# Patient Record
Sex: FEMALE | Race: BLACK OR AFRICAN AMERICAN | NOT HISPANIC OR LATINO | URBAN - METROPOLITAN AREA
[De-identification: names, ages, dates, MRNs, and addresses within clinical notes are randomized per-mention and may not be internally consistent; named-entity substitution may affect disease eponyms.]

---

## 2020-09-28 ENCOUNTER — HOSPITAL ENCOUNTER (EMERGENCY)
Facility: HOSPITAL | Age: 80
Discharge: HOME | End: 2020-09-28
Attending: EMERGENCY MEDICINE
Payer: MEDICARE

## 2020-09-28 VITALS
HEART RATE: 98 BPM | SYSTOLIC BLOOD PRESSURE: 164 MMHG | BODY MASS INDEX: 28 KG/M2 | WEIGHT: 158 LBS | TEMPERATURE: 99 F | DIASTOLIC BLOOD PRESSURE: 84 MMHG | OXYGEN SATURATION: 96 % | RESPIRATION RATE: 16 BRPM | HEIGHT: 63 IN

## 2020-09-28 DIAGNOSIS — I10 HYPERTENSION, UNSPECIFIED TYPE: Primary | ICD-10-CM

## 2020-09-28 DIAGNOSIS — I10 HYPERTENSION: ICD-10-CM

## 2020-09-28 LAB
BUN SERPL-MCNC: 14 MG/DL (ref 6–30)
BUN SERPL-MCNC: 14 MG/DL (ref 6–30)
CHLORIDE SERPL-SCNC: 104 MMOL/L (ref 95–110)
CHLORIDE SERPL-SCNC: 104 MMOL/L (ref 95–110)
CREAT SERPL-MCNC: 0.6 MG/DL (ref 0.5–1.4)
CREAT SERPL-MCNC: 0.6 MG/DL (ref 0.5–1.4)
GLUCOSE SERPL-MCNC: 128 MG/DL (ref 70–110)
GLUCOSE SERPL-MCNC: 129 MG/DL (ref 70–110)
HCT VFR BLD CALC: 38 %PCV (ref 36–54)
HCT VFR BLD CALC: 38 %PCV (ref 36–54)
POC IONIZED CALCIUM: 1.26 MMOL/L (ref 1.06–1.42)
POC IONIZED CALCIUM: 1.27 MMOL/L (ref 1.06–1.42)
POC TCO2 (MEASURED): 26 MMOL/L (ref 23–29)
POC TCO2 (MEASURED): 27 MMOL/L (ref 23–29)
POTASSIUM BLD-SCNC: 3.2 MMOL/L (ref 3.5–5.1)
POTASSIUM BLD-SCNC: 3.2 MMOL/L (ref 3.5–5.1)
SAMPLE: ABNORMAL
SAMPLE: ABNORMAL
SODIUM BLD-SCNC: 144 MMOL/L (ref 136–145)
SODIUM BLD-SCNC: 144 MMOL/L (ref 136–145)
TROPONIN I SERPL DL<=0.01 NG/ML-MCNC: <0.006 NG/ML (ref 0–0.03)

## 2020-09-28 PROCEDURE — 99284 EMERGENCY DEPT VISIT MOD MDM: CPT | Mod: 25

## 2020-09-28 PROCEDURE — 80047 BASIC METABLC PNL IONIZED CA: CPT

## 2020-09-28 PROCEDURE — 84484 ASSAY OF TROPONIN QUANT: CPT

## 2020-09-28 PROCEDURE — 99284 EMERGENCY DEPT VISIT MOD MDM: CPT | Mod: ,,, | Performed by: EMERGENCY MEDICINE

## 2020-09-28 PROCEDURE — 93005 ELECTROCARDIOGRAM TRACING: CPT

## 2020-09-28 PROCEDURE — 93010 ELECTROCARDIOGRAM REPORT: CPT | Mod: ,,, | Performed by: INTERNAL MEDICINE

## 2020-09-28 PROCEDURE — 99284 PR EMERGENCY DEPT VISIT,LEVEL IV: ICD-10-PCS | Mod: ,,, | Performed by: EMERGENCY MEDICINE

## 2020-09-28 PROCEDURE — 93010 EKG 12-LEAD: ICD-10-PCS | Mod: ,,, | Performed by: INTERNAL MEDICINE

## 2020-09-28 RX ORDER — METFORMIN HYDROCHLORIDE 500 MG/1
500 TABLET ORAL 2 TIMES DAILY WITH MEALS
COMMUNITY

## 2020-09-28 RX ORDER — HYDROCHLOROTHIAZIDE 12.5 MG/1
12.5 TABLET ORAL DAILY
COMMUNITY

## 2020-09-28 RX ORDER — ATORVASTATIN CALCIUM 20 MG/1
20 TABLET, FILM COATED ORAL DAILY
COMMUNITY

## 2020-09-28 RX ORDER — LISINOPRIL 40 MG/1
40 TABLET ORAL DAILY
COMMUNITY

## 2020-09-28 RX ORDER — AMLODIPINE BESYLATE 10 MG/1
10 TABLET ORAL DAILY
COMMUNITY

## 2020-09-28 RX ORDER — ASPIRIN 81 MG/1
81 TABLET ORAL DAILY
COMMUNITY

## 2020-09-28 NOTE — ED PROVIDER NOTES
Encounter Date: 9/28/2020    SCRIBE #1 NOTE: I, Kami Granadonga, am scribing for, and in the presence of,  Morro Flores MD. I have scribed the entire note.       History     Chief Complaint   Patient presents with    Hypertension     home health took bp and was 166/117, i don't feel bad     Time patient was seen by the provider: 2:25 PM      The patient is a 80 y.o. female with past medical history of HTN, who presents to the ED with a complaint of high blood pressure. The patient reports of headache this morning which has now resolved on its own. Home health came over today and measured her blood pressure was measured at 166/117. Denies chest pain, shortness of breath.     The history is provided by the patient, medical records and a relative. No  was used.     Review of patient's allergies indicates:  No Known Allergies  Past Medical History:   Diagnosis Date    Diabetes mellitus     High cholesterol     Hypertension      History reviewed. No pertinent surgical history.  No family history on file.  Social History     Tobacco Use    Smoking status: Never Smoker    Smokeless tobacco: Never Used   Substance Use Topics    Alcohol use: Never     Frequency: Never    Drug use: Never     Review of Systems   Constitutional: Negative for fever.   HENT: Negative for sore throat.    Respiratory: Negative for shortness of breath.    Cardiovascular: Negative for chest pain.   Gastrointestinal: Negative for nausea.   Genitourinary: Negative for dysuria.   Musculoskeletal: Negative for back pain.   Skin: Negative for rash.   Neurological: Positive for headaches. Negative for weakness.   Hematological: Does not bruise/bleed easily.       Physical Exam     Initial Vitals [09/28/20 1415]   BP Pulse Resp Temp SpO2   (!) 164/84 98 16 99 °F (37.2 °C) 96 %      MAP       --         Physical Exam    Nursing note and vitals reviewed.  Constitutional: She appears well-developed and well-nourished. She is  not diaphoretic. No distress.   HENT:   Head: Normocephalic and atraumatic.   Mouth/Throat: Oropharynx is clear and moist.   Eyes: Conjunctivae and EOM are normal. Pupils are equal, round, and reactive to light.   Neck: Normal range of motion. Neck supple. No JVD present.   Cardiovascular: Normal rate and regular rhythm.   Murmur heard.   Systolic murmur is present.  Pulmonary/Chest: Breath sounds normal. No respiratory distress. She has no wheezes. She has no rhonchi. She has no rales.   Abdominal: Soft. Bowel sounds are normal. She exhibits no distension and no mass. There is no abdominal tenderness. There is no rebound and no guarding.   Musculoskeletal: Normal range of motion. No tenderness or edema.   Neurological: She is alert and oriented to person, place, and time. She has normal strength. No cranial nerve deficit or sensory deficit.   Skin: Skin is warm and dry. No rash noted.   Psychiatric: She has a normal mood and affect. Thought content normal.         ED Course   Procedures  Labs Reviewed   ISTAT PROCEDURE - Abnormal; Notable for the following components:       Result Value    POC Glucose 128 (*)     POC Potassium 3.2 (*)     All other components within normal limits   ISTAT PROCEDURE - Abnormal; Notable for the following components:    POC Glucose 129 (*)     POC Potassium 3.2 (*)     All other components within normal limits   TROPONIN I     EKG Readings: (Independently Interpreted)   Initial Reading: No STEMI. Rhythm: Sinus Tachycardia. Heart Rate: 100.   ST changes in inferior leads.     ECG Results          EKG 12-lead (Final result)  Result time 09/29/20 14:47:46    Final result by Interface, Lab In [facility] (09/29/20 14:47:46)                 Narrative:    Test Reason : R00.0    Vent. Rate : 103 BPM     Atrial Rate : 103 BPM     P-R Int : 126 ms          QRS Dur : 084 ms      QT Int : 332 ms       P-R-T Axes : 053 -25 073 degrees     QTc Int : 434 ms    Sinus tachycardia  Possible Left atrial  enlargement  Possible Anterior infarct (cited on or before 28-SEP-2020)  ST and T wave abnormality, consider inferior ischemia  Abnormal ECG  When compared with ECG of 28-SEP-2020 14:51,  No significant change was found  Confirmed by Hailee Reynoso MD ([id number]) on 9/29/2020 2:47:32 PM    Referred By:             Confirmed By:Hailee Reynoso MD                             EKG 12-lead (Final result)  Result time 09/29/20 14:47:38    Final result by Interface, Lab In [facility] (09/29/20 14:47:38)                 Narrative:    Test Reason : I10,    Vent. Rate : 101 BPM     Atrial Rate : 101 BPM     P-R Int : 126 ms          QRS Dur : 082 ms      QT Int : 334 ms       P-R-T Axes : 054 -24 098 degrees     QTc Int : 433 ms    Sinus tachycardia  Possible Left atrial enlargement  Anterior infarct ,age undetermined  Abnormal ECG  No previous ECGs available  Confirmed by Hailee Reynoso MD ([id number]) on 9/29/2020 2:47:23 PM    Referred By:             Confirmed By:Hailee Reynoso MD                            Imaging Results    None          Medical Decision Making:   History:   Old Medical Records: I decided to obtain old medical records.  Initial Assessment:   80 y.o female patient presents with elevated blood pressure. Will see if family can obtain medication list. Will check chemistries. No symptoms to suggest stroke, intracranial hemorrhage, MI or hypertensive crisis.   Independently Interpreted Test(s):   I have ordered and independently interpreted EKG Reading(s) - see prior notes  Clinical Tests:   Lab Tests: Ordered and Reviewed  Medical Tests: Ordered and Reviewed  ED Management:  4:58 PM  Studies are negative. Blood pressure is mildly elevated. Recommend to follow up with her PCP.             Scribe Attestation:   Scribe #1: I performed the above scribed service and the documentation accurately describes the services I performed. I attest to the accuracy of the note.                      Clinical Impression:     ICD-10-CM  ICD-9-CM   1. Hypertension, unspecified type  I10 401.9   2. Hypertension  I10 401.9                      Disposition:   Disposition: Discharged  Condition: Stable     ED Disposition Condition    Discharge Stable        ED Prescriptions     None        Follow-up Information     Follow up With Specialties Details Why Contact Info Additional Information    [facility] Schedule an appointment as soon as possible for a visit in 2 days For Primary Care and Emergency Department Follow-up [address] [facility] [address] [phone] [facility] Please park in surface lot and check in at central registration desk    [facility] Emergency Medicine  [facility] symptoms worsen [address] [facility] [address] [phone]                                        Morro Flores MD  10/02/20 0852

## 2020-09-28 NOTE — ED NOTES
Patient offered a warm blanket. Patient declined and states that she's comfortable with the temperature of the room.  Updated patient on plan of care. No other needs at this time.  Will continue to monitor.

## 2020-09-28 NOTE — ED NOTES
I-STAT Chem-8+ Results:   Value Reference Range   Sodium 144 136-145 mmol/L   Potassium  3.2 3.5-5.1 mmol/L   Chloride 104  mmol/L   Ionized Calcium 1.26 1.06-1.42 mmol/L   CO2 (measured) 27 23-29 mmol/L   Glucose 129  mg/dL   BUN 14 6-30 mg/dL   Creatinine 0.6 0.5-1.4 mg/dL   Hematocrit 38 36-54%

## 2024-01-11 ENCOUNTER — HOSPITAL ENCOUNTER (EMERGENCY)
Facility: HOSPITAL | Age: 84
Discharge: HOME | End: 2024-01-11
Attending: EMERGENCY MEDICINE
Payer: MEDICARE

## 2024-01-11 VITALS
OXYGEN SATURATION: 98 % | BODY MASS INDEX: 25.34 KG/M2 | SYSTOLIC BLOOD PRESSURE: 139 MMHG | TEMPERATURE: 99 F | DIASTOLIC BLOOD PRESSURE: 91 MMHG | WEIGHT: 143 LBS | RESPIRATION RATE: 16 BRPM | HEART RATE: 113 BPM | HEIGHT: 63 IN

## 2024-01-11 DIAGNOSIS — R07.9 CHEST PAIN: ICD-10-CM

## 2024-01-11 DIAGNOSIS — R42 DIZZINESS: ICD-10-CM

## 2024-01-11 DIAGNOSIS — I10 ESSENTIAL HYPERTENSION: Primary | ICD-10-CM

## 2024-01-11 DIAGNOSIS — E11.9 TYPE 2 DIABETES MELLITUS WITHOUT COMPLICATION, WITHOUT LONG-TERM CURRENT USE OF INSULIN: ICD-10-CM

## 2024-01-11 PROBLEM — H25.13 AGE-RELATED NUCLEAR CATARACT OF BOTH EYES: Status: ACTIVE | Noted: 2023-01-11

## 2024-01-11 PROBLEM — H40.033 ANATOMICAL NARROW ANGLE OF BOTH EYES: Status: ACTIVE | Noted: 2023-01-11

## 2024-01-11 PROBLEM — R09.82 POST-NASAL DRIP: Status: ACTIVE | Noted: 2022-12-08

## 2024-01-11 PROBLEM — E78.00 PURE HYPERCHOLESTEROLEMIA: Status: ACTIVE | Noted: 2022-12-08

## 2024-01-11 PROBLEM — M19.90 OSTEOARTHROSIS: Status: ACTIVE | Noted: 2024-01-11

## 2024-01-11 PROBLEM — H40.9 GLAUCOMA: Status: ACTIVE | Noted: 2022-12-08

## 2024-01-11 PROBLEM — E66.3 OVERWEIGHT: Status: ACTIVE | Noted: 2024-01-11

## 2024-01-11 PROBLEM — Z00.00 ANNUAL PHYSICAL EXAM: Status: ACTIVE | Noted: 2022-12-08

## 2024-01-11 PROBLEM — F32.A DEPRESSION: Status: ACTIVE | Noted: 2022-12-08

## 2024-01-11 LAB
ALBUMIN SERPL BCP-MCNC: 3.9 G/DL (ref 3.5–5.2)
ALP SERPL-CCNC: 43 U/L (ref 55–135)
ALT SERPL W/O P-5'-P-CCNC: 14 U/L (ref 10–44)
ANION GAP SERPL CALC-SCNC: 9 MMOL/L (ref 8–16)
AST SERPL-CCNC: 30 U/L (ref 10–40)
BASOPHILS # BLD AUTO: 0.04 K/UL (ref 0–0.2)
BASOPHILS NFR BLD: 0.7 % (ref 0–1.9)
BILIRUB SERPL-MCNC: 0.4 MG/DL (ref 0.1–1)
BUN SERPL-MCNC: 24 MG/DL (ref 8–23)
CALCIUM SERPL-MCNC: 10.2 MG/DL (ref 8.7–10.5)
CHLORIDE SERPL-SCNC: 107 MMOL/L (ref 95–110)
CO2 SERPL-SCNC: 23 MMOL/L (ref 23–29)
CREAT SERPL-MCNC: 1 MG/DL (ref 0.5–1.4)
D DIMER PPP IA.FEU-MCNC: 1.36 MG/L FEU
DIFFERENTIAL METHOD BLD: ABNORMAL
EOSINOPHIL # BLD AUTO: 0.1 K/UL (ref 0–0.5)
EOSINOPHIL NFR BLD: 1.1 % (ref 0–8)
ERYTHROCYTE [DISTWIDTH] IN BLOOD BY AUTOMATED COUNT: 12.5 % (ref 11.5–14.5)
EST. GFR  (NO RACE VARIABLE): 55.9 ML/MIN/1.73 M^2
GLUCOSE SERPL-MCNC: 118 MG/DL (ref 70–110)
HCT VFR BLD AUTO: 37.1 % (ref 37–48.5)
HCV AB SERPL QL IA: NORMAL
HGB BLD-MCNC: 11.9 G/DL (ref 12–16)
HIV 1+2 AB+HIV1 P24 AG SERPL QL IA: NORMAL
IMM GRANULOCYTES # BLD AUTO: 0.01 K/UL (ref 0–0.04)
IMM GRANULOCYTES NFR BLD AUTO: 0.2 % (ref 0–0.5)
LYMPHOCYTES # BLD AUTO: 2.8 K/UL (ref 1–4.8)
LYMPHOCYTES NFR BLD: 45.1 % (ref 18–48)
MCH RBC QN AUTO: 29.6 PG (ref 27–31)
MCHC RBC AUTO-ENTMCNC: 32.1 G/DL (ref 32–36)
MCV RBC AUTO: 92 FL (ref 82–98)
MONOCYTES # BLD AUTO: 0.5 K/UL (ref 0.3–1)
MONOCYTES NFR BLD: 8 % (ref 4–15)
NEUTROPHILS # BLD AUTO: 2.7 K/UL (ref 1.8–7.7)
NEUTROPHILS NFR BLD: 44.9 % (ref 38–73)
NRBC BLD-RTO: 0 /100 WBC
PLATELET # BLD AUTO: 261 K/UL (ref 150–450)
PMV BLD AUTO: 11 FL (ref 9.2–12.9)
POC CARDIAC TROPONIN I: 0 NG/ML (ref 0–0.08)
POTASSIUM SERPL-SCNC: 4.5 MMOL/L (ref 3.5–5.1)
PROT SERPL-MCNC: 9 G/DL (ref 6–8.4)
RBC # BLD AUTO: 4.02 M/UL (ref 4–5.4)
SAMPLE: NORMAL
SODIUM SERPL-SCNC: 139 MMOL/L (ref 136–145)
TROPONIN I SERPL DL<=0.01 NG/ML-MCNC: <0.006 NG/ML (ref 0–0.03)
WBC # BLD AUTO: 6.1 K/UL (ref 3.9–12.7)

## 2024-01-11 PROCEDURE — 85379 FIBRIN DEGRADATION QUANT: CPT

## 2024-01-11 PROCEDURE — 99285 EMERGENCY DEPT VISIT HI MDM: CPT | Mod: 25

## 2024-01-11 PROCEDURE — 87389 HIV-1 AG W/HIV-1&-2 AB AG IA: CPT | Performed by: PHYSICIAN ASSISTANT

## 2024-01-11 PROCEDURE — 80053 COMPREHEN METABOLIC PANEL: CPT

## 2024-01-11 PROCEDURE — 84484 ASSAY OF TROPONIN QUANT: CPT

## 2024-01-11 PROCEDURE — 93010 ELECTROCARDIOGRAM REPORT: CPT | Mod: 76,,, | Performed by: INTERNAL MEDICINE

## 2024-01-11 PROCEDURE — 93005 ELECTROCARDIOGRAM TRACING: CPT

## 2024-01-11 PROCEDURE — 93010 ELECTROCARDIOGRAM REPORT: CPT | Mod: ,,, | Performed by: INTERNAL MEDICINE

## 2024-01-11 PROCEDURE — 86803 HEPATITIS C AB TEST: CPT | Performed by: PHYSICIAN ASSISTANT

## 2024-01-11 PROCEDURE — 85025 COMPLETE CBC W/AUTO DIFF WBC: CPT

## 2024-01-11 PROCEDURE — 25500020 PHARM REV CODE 255: Performed by: EMERGENCY MEDICINE

## 2024-01-11 RX ORDER — TRAZODONE HYDROCHLORIDE 50 MG/1
50 TABLET ORAL
COMMUNITY
Start: 2023-02-16 | End: 2024-02-16

## 2024-01-11 RX ORDER — PREDNISOLONE ACETATE 10 MG/ML
SUSPENSION/ DROPS OPHTHALMIC
COMMUNITY

## 2024-01-11 RX ORDER — LATANOPROST 50 UG/ML
1 SOLUTION/ DROPS OPHTHALMIC
COMMUNITY
Start: 2023-01-11 | End: 2024-01-11

## 2024-01-11 RX ORDER — MECLIZINE HYDROCHLORIDE 25 MG/1
25 TABLET ORAL 3 TIMES DAILY PRN
Qty: 20 TABLET | Refills: 0 | Status: SHIPPED | OUTPATIENT
Start: 2024-01-11

## 2024-01-11 RX ORDER — KETOROLAC TROMETHAMINE 5 MG/ML
SOLUTION OPHTHALMIC
COMMUNITY
Start: 2023-05-05

## 2024-01-11 RX ADMIN — IOHEXOL 75 ML: 350 INJECTION, SOLUTION INTRAVENOUS at 11:01

## 2024-01-11 NOTE — DISCHARGE INSTRUCTIONS
Diagnosis: Dizziness    Tests today showed:   Labs Reviewed   CBC W/ AUTO DIFFERENTIAL - Abnormal; Notable for the following components:       Result Value    Hemoglobin 11.9 (*)     All other components within normal limits    Narrative:     Release to patient->Immediate   COMPREHENSIVE METABOLIC PANEL - Abnormal; Notable for the following components:    Glucose 118 (*)     BUN 24 (*)     Total Protein 9.0 (*)     Alkaline Phosphatase 43 (*)     eGFR 55.9 (*)     All other components within normal limits    Narrative:     Release to patient->Immediate   D DIMER, QUANTITATIVE - Abnormal; Notable for the following components:    D-Dimer 1.36 (*)     All other components within normal limits    Narrative:     Release to patient->Immediate   HIV 1 / 2 ANTIBODY    Narrative:     Release to patient->Immediate   HEPATITIS C ANTIBODY    Narrative:     Release to patient->Immediate   TROPONIN I    Narrative:     Release to patient->Immediate   TROPONIN ISTAT   B-TYPE NATRIURETIC PEPTIDE   URINALYSIS, REFLEX TO URINE CULTURE   POCT TROPONIN   POCT TROPONIN     Imaging Results               CTA Chest Non-Coronary (PE Studies) (Final result)  Result time 01/11/24 12:08:38      Final result by Chas Craig MD (01/11/24 12:08:38)                   Impression:      1. Study considered diagnostic to the proximal segmental pulmonary arterial level without convincing evidence of acute pulmonary embolism.  2. No definite acute intrathoracic findings.  3. Solid nodule measuring on the order of 12-13 mm in the left lower lobe.  For a solid nodule >8 mm, Fleischner Society 2017 guidelines recommend considering CT, PET/CT or tissue sampling at 3 months.  4. Additional details, as provided in the body of report.  This report was flagged in Epic as abnormal.      Electronically signed by: Chas Craig  Date:    01/11/2024  Time:    12:08               Narrative:    EXAMINATION:  CTA CHEST NON CORONARY (PE STUDIES)    CLINICAL  HISTORY:  Pulmonary embolism (PE) suspected, positive D-dimer;    TECHNIQUE:  Low dose axial images, sagittal and coronal reformations were obtained from the thoracic inlet to the lung bases following the IV administration of 75 mL of Omnipaque 350.  Contrast timing was optimized to evaluate the pulmonary arteries.  MIP images were performed.    COMPARISON:  None    FINDINGS:  Exam quality: Study considered diagnostic to the proximal segmental pulmonary arterial level.    Pulmonary embolism: No acute or chronic pulmonary embolism.    Central pulmonary arteries: Normal caliber.    Aorta: Nonaneurysmal.  Left-sided arch.  Conventional 3 vessel arch anatomy.  Moderate atherosclerotic calcification.    Heart: No right heart strain. Normal size.    Coronary arteries: At least moderate atherosclerotic calcifications.    Pericardium: Normal. No effusion, thickening, or calcification.    Support tubes and lines: None.    Base of neck/thyroid: Normal.    Lymph nodes: No supraclavicular, axillary, internal mammary, mediastinal, or hilar adenopathy.    Esophagus: Normal.    Pleura: No effusion, thickening, or calcification.    Upper abdomen: Unremarkable    Body wall: Unremarkable    Airways: Central airways patent.    Lungs: Assessment lungs compromised by respiratory motion.  12-13 mm solid nodule in the left lower lobe (series 3, image 202).    Calcified granuloma noted elsewhere.  Atelectasis and scar.    Bones: No definite acute abnormality.  Relatively modest degenerative findings of the spine.                                      Treatments you had today:   Medications   iohexoL (OMNIPAQUE 350) injection 75 mL (75 mLs Intravenous Given 1/11/24 4752)       Home Care Instructions:  - Stay well hydrated and well rested  - Rest in a dark and quiet room  - Meclizine as needed for dizziness  - Do not skip meals  - Continue taking your home medications as prescribed    Follow-Up Plan:  - Follow-up with: Primary care doctor  within 3 - 5 days  - Additional testing and/or evaluation as directed by your primary doctor    Return to the Emergency Department for symptoms including: worsening symptoms, worsening headache or a new headache which is severe, headache with vision changes, headache with neck stiffness or fever, numbness or tingling, weakness, problems with coordination, speech changes, vomiting with inability to hold down fluids, severe headache different from previous headaches, dizziness, passing out/fainting/unconsciousness, or other concerning symptoms.

## 2024-01-11 NOTE — ED NOTES
"Patient unable to go to the bathroom and provide urine sample, pt states she just urinated and it will be "a couple of hours" until she goes again. Pt desires to be discharge.  "

## 2024-01-11 NOTE — ED NOTES
Assumed care of pt at this time. RR even and unlabored. Resting in bed comfortably. No voiced compaints of pain or discomfort at this time. Safety protocols remain.    Patient identifiers verified and correct for   LOC: The patient is awake, alert and aware of environment with an appropriate affect, the patient is oriented x 4 and speaking appropriately.   APPEARANCE: Patient appears comfortable and in no acute distress, patient is clean and well groomed.  SKIN: The skin is warm and dry, color consistent with ethnicity, patient has normal skin turgor and moist mucus membranes, skin intact, no breakdown or bruising noted.   MUSCULOSKELETAL: Patient moving all extremities spontaneously, no swelling noted.  RESPIRATORY: Airway is open and patent, respirations are spontaneous, patient has a normal effort and rate, no accessory muscle use noted, on room air.  CARDIAC: pt denies any chest pain, no edema noted, capillary refill < 3 seconds.   GASTRO: Soft and non tender to palpation, no distention noted, normoactive bowel sounds present in all four quadrants. Pt states bowel movements have been regular. Continent of bowel  : Pt denies any pain or frequency with urination. Continent of urine  NEURO: Pt opens eyes spontaneously, behavior appropriate to situation, follows commands, facial expression symmetrical, bilateral hand grasp equal and even, purposeful motor response noted, normal sensation in all extremities when touched with a finger. Denies   H/A at this time.

## 2024-01-11 NOTE — PROVIDER PROGRESS NOTES - EMERGENCY DEPT.
Encounter Date: 1/11/2024    ED Physician Progress Notes            ECG, independently reviewed and interpreted by me, reveals sinus rhythm there is ST depression inferolaterally that is concerning. Moving patient to the back to ED bed for further evaluation.

## 2024-01-11 NOTE — ED NOTES
Telemetry Verification   Patient placed on Telemetry Box  Verified on ED monitor  Box [id number]   Monitor Tech ts    Rate 92   Rhythm Sinus

## 2024-01-11 NOTE — PROVIDER PROGRESS NOTES - EMERGENCY DEPT.
Encounter Date: 1/11/2024    ED Physician Progress Notes          Pt with abnormal EKG, I did not see or evaluate this patient.  She was transitioned to main ED bed.

## 2024-01-11 NOTE — ED PROVIDER NOTES
"Encounter Date: 1/11/2024       History     Chief Complaint   Patient presents with    Headache     Pt c/o head "feels funny" since Tuesday am.      83-year-old female with a past medical history of type 2 diabetes hypertension and hypercholesterolemia presents with a chief complaint of dizziness.  She says that she was felt unwell since Tuesday.  She is denying any vertigo but says that she just feels a little bit lightheaded when she stands up.  She is denying any trauma, fever, chest pain, passing out, shortness of breath, abdominal pain, burning with urination, nausea, vomiting, diarrhea or new rashes.    The history is provided by the patient. No  was used.     Review of patient's allergies indicates:  No Known Allergies  Past Medical History:   Diagnosis Date    Diabetes mellitus     High cholesterol     Hypertension      History reviewed. No pertinent surgical history.  History reviewed. No pertinent family history.  Social History     Tobacco Use    Smoking status: Never    Smokeless tobacco: Never   Substance Use Topics    Alcohol use: Never    Drug use: Never     Review of Systems    Physical Exam     Initial Vitals [01/11/24 0948]   BP Pulse Resp Temp SpO2   (!) 139/91 (!) 113 16 98.9 °F (37.2 °C) 98 %      MAP       --         Physical Exam    Nursing note and vitals reviewed.  Constitutional: She appears well-developed and well-nourished. She is not diaphoretic. No distress.   HENT:   Head: Normocephalic and atraumatic.   Eyes: EOM are normal.   Neck: Neck supple.   Cardiovascular:  Regular rhythm, normal heart sounds and intact distal pulses.           Patient was mildly tachycardic   Pulmonary/Chest: Breath sounds normal. She has no wheezes. She has no rhonchi. She has no rales.   Abdominal: Abdomen is soft. There is no abdominal tenderness. There is no rebound and no guarding.   Musculoskeletal:         General: No tenderness or edema. Normal range of motion.      Cervical back: " Neck supple.     Neurological: She is alert and oriented to person, place, and time. She has normal strength.   Cranial nerves 2-12 grossly intact, there was no pronator drift, strength and sensation equal and preserved in the upper and lower extremities, patient is ambulatory without gait abnormality   Skin: Skin is warm and dry. Capillary refill takes less than 2 seconds. No rash noted. No erythema. No pallor.   Psychiatric: She has a normal mood and affect. Her behavior is normal. Judgment and thought content normal.         ED Course   Procedures  Labs Reviewed   CBC W/ AUTO DIFFERENTIAL - Abnormal; Notable for the following components:       Result Value    Hemoglobin 11.9 (*)     All other components within normal limits    Narrative:     Release to patient->Immediate   COMPREHENSIVE METABOLIC PANEL - Abnormal; Notable for the following components:    Glucose 118 (*)     BUN 24 (*)     Total Protein 9.0 (*)     Alkaline Phosphatase 43 (*)     eGFR 55.9 (*)     All other components within normal limits    Narrative:     Release to patient->Immediate   D DIMER, QUANTITATIVE - Abnormal; Notable for the following components:    D-Dimer 1.36 (*)     All other components within normal limits    Narrative:     Release to patient->Immediate   HIV 1 / 2 ANTIBODY    Narrative:     Release to patient->Immediate   HEPATITIS C ANTIBODY    Narrative:     Release to patient->Immediate   TROPONIN I    Narrative:     Release to patient->Immediate   TROPONIN ISTAT   B-TYPE NATRIURETIC PEPTIDE        ECG Results              EKG 12-lead (Final result)  Result time 01/11/24 14:35:48      Final result by Interface, Lab In [facility] (01/11/24 14:35:48)                   Narrative:    Test Reason : R42,    Vent. Rate : 090 BPM     Atrial Rate : 090 BPM     P-R Int : 138 ms          QRS Dur : 082 ms      QT Int : 354 ms       P-R-T Axes : 073 -09 084 degrees     QTc Int : 433 ms    Normal sinus rhythm  Possible Left atrial  enlargement  Low anterior forces and R wave progression  Abnormal ECG  When compared with ECG of 11-JAN-2024 10:02,  T wave inversion no longer evident in Lateral leads  Confirmed by Vazquez GERMAIN MD ([id number]) on 1/11/2024 2:35:39 PM    Referred By: SULAIMAN   SELF           Confirmed By:Vazquez GERMAIN MD                                     Repeat EKG 12-lead (Final result)  Result time 01/11/24 11:01:21      Final result by Interface, Lab In [facility] (01/11/24 11:01:21)                   Narrative:    Test Reason : R07.9,    Vent. Rate : 095 BPM     Atrial Rate : 095 BPM     P-R Int : 138 ms          QRS Dur : 078 ms      QT Int : 324 ms       P-R-T Axes : 069 -21 -22 degrees     QTc Int : 407 ms    Normal sinus rhythm  Possible Left atrial enlargement  Low anterior forces and R wave progression  Marked ST abnormality, possible inferior subendocardial injury  Abnormal ECG  When compared with ECG of 28-SEP-2020 14:53,  T wave inversion no longer evident in Inferior leads  Confirmed by Vazquez GERMAIN MD ([id number]) on 1/11/2024 11:01:12 AM    Referred By: SULAIMAN   SELF           Confirmed By:Vazquez GERMAIN MD                                  Imaging Results               CTA Chest Non-Coronary (PE Studies) (Final result)  Result time 01/11/24 12:08:38      Final result by Chas Craig MD (01/11/24 12:08:38)                   Impression:      1. Study considered diagnostic to the proximal segmental pulmonary arterial level without convincing evidence of acute pulmonary embolism.  2. No definite acute intrathoracic findings.  3. Solid nodule measuring on the order of 12-13 mm in the left lower lobe.  For a solid nodule >8 mm, Fleischner Society 2017 guidelines recommend considering CT, PET/CT or tissue sampling at 3 months.  4. Additional details, as provided in the body of report.  This report was flagged in Epic as abnormal.      Electronically signed by: Chas Craig  Date:    01/11/2024  Time:    12:08                Narrative:    EXAMINATION:  CTA CHEST NON CORONARY (PE STUDIES)    CLINICAL HISTORY:  Pulmonary embolism (PE) suspected, positive D-dimer;    TECHNIQUE:  Low dose axial images, sagittal and coronal reformations were obtained from the thoracic inlet to the lung bases following the IV administration of 75 mL of Omnipaque 350.  Contrast timing was optimized to evaluate the pulmonary arteries.  MIP images were performed.    COMPARISON:  None    FINDINGS:  Exam quality: Study considered diagnostic to the proximal segmental pulmonary arterial level.    Pulmonary embolism: No acute or chronic pulmonary embolism.    Central pulmonary arteries: Normal caliber.    Aorta: Nonaneurysmal.  Left-sided arch.  Conventional 3 vessel arch anatomy.  Moderate atherosclerotic calcification.    Heart: No right heart strain. Normal size.    Coronary arteries: At least moderate atherosclerotic calcifications.    Pericardium: Normal. No effusion, thickening, or calcification.    Support tubes and lines: None.    Base of neck/thyroid: Normal.    Lymph nodes: No supraclavicular, axillary, internal mammary, mediastinal, or hilar adenopathy.    Esophagus: Normal.    Pleura: No effusion, thickening, or calcification.    Upper abdomen: Unremarkable    Body wall: Unremarkable    Airways: Central airways patent.    Lungs: Assessment lungs compromised by respiratory motion.  12-13 mm solid nodule in the left lower lobe (series 3, image 202).    Calcified granuloma noted elsewhere.  Atelectasis and scar.    Bones: No definite acute abnormality.  Relatively modest degenerative findings of the spine.                                       Medications   iohexoL (OMNIPAQUE 350) injection 75 mL (75 mLs Intravenous Given 1/11/24 1132)     Medical Decision Making  See ED course for remainder of care    Amount and/or Complexity of Data Reviewed  Labs: ordered.  Radiology: ordered.  ECG/medicine tests:  Decision-making details documented in ED  Course.    Risk  Prescription drug management.               ED Course as of 01/11/24 1534   Thu Jan 11, 2024   1015 83-year-old female in no acute distress.  She is denying any acute pain on my exam.  Differential includes but is not limited to ACS versus dehydration versus dysrhythmia versus electrolyte abnormality versus metabolic derangement, consider posterior stroke, however patient has no dizziness at rest, also considered dural venous sinus thrombosis, but less likely [BP]   1019 Repeat EKG 12-lead  Repeat, when compared to previous from 17 minutes ago Normal sinus rhythm at 90 beats per minute, left atrial enlargement, stable ST depressions in lateral leads from prior visits, elevations in AVR show interval improvement, no STEMI,  [BP]   1533 D-Dimer(!): 1.36  CT PE ordered [BP]   1533 CTA Chest Non-Coronary (PE Studies)(!)  No PE, incidental lung nodules which were discussed with the patient.  The patient says that she feels better and would like to go home.  We did not find an emergent cause for the patient's lightheadedness, I discussed return precautions with the patient and answered all her questions.  I provided her with discharge paperwork and she was discharged in stable condition. [BP]      ED Course User Index  [BP] Bjorn Jimenez MD                           Clinical Impression:  Final diagnoses:  [R42] Dizziness  [R07.9] Chest pain  [I10] Essential hypertension (Primary)  [E11.9] Type 2 diabetes mellitus without complication, without long-term current use of insulin          ED Disposition Condition    Discharge Stable          ED Prescriptions       Medication Sig Dispense Start Date End Date Auth. Provider    meclizine (ANTIVERT) 25 mg tablet Take 1 tablet (25 mg total) by mouth 3 (three) times daily as needed. 20 tablet 1/11/2024 -- Bjorn Jimenez MD          Follow-up Information       Follow up With Specialties Details Why Contact Info    [facility] Emergency Medicine  As  needed, If symptoms worsen [address]  [facility] [id number]  [phone]             Bjorn Jimenez MD  Resident  01/11/24 0714

## 2024-04-15 PROBLEM — Z00.00 ANNUAL PHYSICAL EXAM: Status: RESOLVED | Noted: 2022-12-08 | Resolved: 2024-04-15
